# Patient Record
Sex: FEMALE | URBAN - METROPOLITAN AREA
[De-identification: names, ages, dates, MRNs, and addresses within clinical notes are randomized per-mention and may not be internally consistent; named-entity substitution may affect disease eponyms.]

---

## 2022-12-19 ENCOUNTER — HOSPITAL ENCOUNTER (EMERGENCY)
Age: 52
Discharge: LEFT BEFORE DISCHARGE | End: 2022-12-20

## 2022-12-19 VITALS
OXYGEN SATURATION: 98 % | HEIGHT: 62 IN | RESPIRATION RATE: 18 BRPM | TEMPERATURE: 97.5 F | BODY MASS INDEX: 30.55 KG/M2 | SYSTOLIC BLOOD PRESSURE: 184 MMHG | DIASTOLIC BLOOD PRESSURE: 98 MMHG | WEIGHT: 166 LBS | HEART RATE: 83 BPM

## 2022-12-20 NOTE — ED TRIAGE NOTES
Pt arrives to ed reporting left sided abd pain that began two weeks ago. Pt was at [facility] and instructed to come in to ed to rule out kidney stone.